# Patient Record
Sex: MALE | Race: WHITE
[De-identification: names, ages, dates, MRNs, and addresses within clinical notes are randomized per-mention and may not be internally consistent; named-entity substitution may affect disease eponyms.]

---

## 2017-01-25 PROBLEM — Z00.00 ENCOUNTER FOR PREVENTIVE HEALTH EXAMINATION: Status: ACTIVE | Noted: 2017-01-25

## 2017-01-28 ENCOUNTER — APPOINTMENT (OUTPATIENT)
Dept: MRI IMAGING | Facility: CLINIC | Age: 57
End: 2017-01-28

## 2017-01-28 ENCOUNTER — OUTPATIENT (OUTPATIENT)
Dept: OUTPATIENT SERVICES | Facility: HOSPITAL | Age: 57
LOS: 1 days | End: 2017-01-28
Payer: COMMERCIAL

## 2017-01-28 DIAGNOSIS — Z00.8 ENCOUNTER FOR OTHER GENERAL EXAMINATION: ICD-10-CM

## 2017-01-28 PROCEDURE — 73721 MRI JNT OF LWR EXTRE W/O DYE: CPT

## 2017-03-04 ENCOUNTER — EMERGENCY (EMERGENCY)
Facility: HOSPITAL | Age: 57
LOS: 0 days | Discharge: HOME | End: 2017-03-04
Attending: EMERGENCY MEDICINE
Payer: COMMERCIAL

## 2017-03-04 VITALS
HEIGHT: 70 IN | HEART RATE: 86 BPM | WEIGHT: 270.07 LBS | OXYGEN SATURATION: 98 % | SYSTOLIC BLOOD PRESSURE: 160 MMHG | TEMPERATURE: 98 F | DIASTOLIC BLOOD PRESSURE: 98 MMHG | RESPIRATION RATE: 17 BRPM

## 2017-03-04 DIAGNOSIS — I10 ESSENTIAL (PRIMARY) HYPERTENSION: ICD-10-CM

## 2017-03-04 DIAGNOSIS — E78.00 PURE HYPERCHOLESTEROLEMIA, UNSPECIFIED: ICD-10-CM

## 2017-03-04 DIAGNOSIS — M1A.4520: ICD-10-CM

## 2017-03-04 DIAGNOSIS — M25.562 PAIN IN LEFT KNEE: ICD-10-CM

## 2017-03-04 PROCEDURE — 99053 MED SERV 10PM-8AM 24 HR FAC: CPT

## 2017-03-04 PROCEDURE — 73562 X-RAY EXAM OF KNEE 3: CPT | Mod: 26,LT

## 2017-03-04 PROCEDURE — 99283 EMERGENCY DEPT VISIT LOW MDM: CPT | Mod: 25

## 2017-03-04 RX ORDER — ALLOPURINOL 300 MG
1 TABLET ORAL
Qty: 0 | Refills: 0 | COMMUNITY

## 2017-03-04 RX ORDER — INDOMETHACIN 50 MG
1 CAPSULE ORAL
Qty: 9 | Refills: 0 | OUTPATIENT
Start: 2017-03-04 | End: 2017-03-07

## 2017-03-04 RX ORDER — SIMVASTATIN 20 MG/1
1 TABLET, FILM COATED ORAL
Qty: 0 | Refills: 0 | COMMUNITY

## 2017-03-04 RX ORDER — CHOLECALCIFEROL (VITAMIN D3) 125 MCG
1 CAPSULE ORAL
Qty: 0 | Refills: 0 | COMMUNITY

## 2017-03-04 RX ORDER — OMEGA-3 ACID ETHYL ESTERS 1 G
0 CAPSULE ORAL
Qty: 0 | Refills: 0 | COMMUNITY

## 2017-03-04 RX ORDER — DICLOFENAC SODIUM 75 MG/1
1 TABLET, DELAYED RELEASE ORAL
Qty: 0 | Refills: 0 | COMMUNITY

## 2017-03-04 NOTE — ED PROVIDER NOTE - PHYSICAL EXAMINATION
Gen: Alert, Well appearing. NAD  Head: NC, AT, PERRL, EOMI, normal lids/conjunctiva ENT: Bilateral TM WNL, normal hearing, patent oropharynx without erythema/exudate, uvula midline Neck: supple, no tenderness/meningismus/JVD Pulm: Bilateral clear BS, normal resp effort, no wheeze/stridor/retractions  CV: RRR, no M/R/G, +dist pulses Abd: soft, NT/ND, +BS, no guarding/rebound tenderness  Mskel: scant edema to left knee. no warmth. full rom of knee with no stiffness. no erythema. no valgus/varus pressure. pulses intact. Skin: no rash   Neuro: AAOx3, no sensory/motor deficits, CN 2-12 intact

## 2017-03-04 NOTE — ED PROVIDER NOTE - OBJECTIVE STATEMENT
57yo male with pmh HTN, gout (on allopurinol), presents with left knee pain x 4 days. Denies trauma. Pt also with fallen arches on rt foot and placed on orthotic on rt foot for 2 weeks, and has been favoring the left knee. Pt also reports seen by PMD yesterday and allopurinol inc to 300mg. Pt on diclofenac for foot pain but not helping knee pain.     No fever/chills. No photophobia/eye pain/changes in vision, No ear pain/sore throat/dysphagia, No chest pain/palpitations. No SOB/cough/stridor. No abdominal pain, N/V/D, no black/bloody bm. No dysuria/frequency/discharge, No headache. No Dizziness.  No rash.  No numbness/tingling/weakness.

## 2017-04-11 NOTE — ED PROVIDER NOTE - MEDICAL DECISION MAKING DETAILS
Pt here for BP check and brought in home readings for PCP to review.  Reg cuff  136/90  P=70  Lg cuff  130/80    Pt states he is going to buy a new BP machine since his is old.  
Likely arthritis/knee sprain vs early gout. for analgesia, fu with ortho. Discussed results and outcome of testing with the patient.  Patient given copy of available results. Patient advised to please follow up with their PMD within the next 24 hours and return to the Emergency Department for worsening symptoms or any other concerns.

## 2017-06-22 NOTE — ED ADULT NURSE REASSESSMENT NOTE - GENERAL PATIENT STATE
TRANSFER - OUT REPORT:    Verbal report given to ROCAEL Thakkar on Casey Anderson  being transferred to [phone] for routine progression of care       Report consisted of patients Situation, Background, Assessment and   Recommendations(SBAR). Information from the following report(s) Procedure Summary was reviewed with the receiving nurse. Lines:   Peripheral IV 06/15/17 Left Arm (Active)   Site Assessment Clean, dry, & intact 6/22/2017  9:00 AM   Phlebitis Assessment 0 6/22/2017  9:00 AM   Infiltration Assessment 0 6/22/2017  9:00 AM   Dressing Status Clean, dry, & intact 6/22/2017  9:00 AM   Dressing Type Tape;Transparent 6/22/2017  9:00 AM   Hub Color/Line Status Green; Infusing 6/22/2017  9:00 AM   Action Taken Open ports on tubing capped 6/19/2017  3:02 PM        Opportunity for questions and clarification was provided.       Patient transported with: comfortable appearance

## 2024-06-14 NOTE — ED ADULT NURSE REASSESSMENT NOTE - NS ED NURSE REASSESS COMMENT FT1
normal appearance , without tenderness upon palpation , no deformities , trachea midline , Thyroid normal size , no masses , thyroid nontender pt seen and re-evaluated by ED attending discharge ready in stable condition

## 2024-11-24 NOTE — ED ADULT NURSE NOTE - OBJECTIVE STATEMENT
Message Type:  Medication Question or Concern    Medication Question:      sertraline (ZOLOFT) 100 MG tablet   150 mg instead   Preferred pharmacy verified and selected.   [id number] - [place], [place] - [facility] [address] AT [facility] [address]   received pt lying on stertcher c/o pain to the left x3 days, difficulty walking  hx gout